# Patient Record
Sex: FEMALE | Employment: OTHER | URBAN - NONMETROPOLITAN AREA
[De-identification: names, ages, dates, MRNs, and addresses within clinical notes are randomized per-mention and may not be internally consistent; named-entity substitution may affect disease eponyms.]

---

## 2020-05-11 ENCOUNTER — OFFICE VISIT (OUTPATIENT)
Dept: FAMILY MEDICINE CLINIC | Age: 85
End: 2020-05-11
Payer: MEDICARE

## 2020-05-11 VITALS
DIASTOLIC BLOOD PRESSURE: 76 MMHG | RESPIRATION RATE: 18 BRPM | TEMPERATURE: 97.7 F | BODY MASS INDEX: 21.79 KG/M2 | HEIGHT: 63 IN | SYSTOLIC BLOOD PRESSURE: 130 MMHG | OXYGEN SATURATION: 97 % | WEIGHT: 123 LBS | HEART RATE: 80 BPM

## 2020-05-11 PROCEDURE — 99204 OFFICE O/P NEW MOD 45 MIN: CPT | Performed by: PHYSICIAN ASSISTANT

## 2020-05-11 RX ORDER — ISOSORBIDE MONONITRATE 30 MG/1
TABLET, EXTENDED RELEASE ORAL
COMMUNITY
Start: 2017-08-22

## 2020-05-11 RX ORDER — FUROSEMIDE 40 MG/1
TABLET ORAL
COMMUNITY
Start: 2017-08-22

## 2020-05-11 RX ORDER — SULFASALAZINE 500 MG/1
TABLET, DELAYED RELEASE ORAL
COMMUNITY

## 2020-05-11 RX ORDER — POTASSIUM CHLORIDE 750 MG/1
TABLET, EXTENDED RELEASE ORAL
COMMUNITY

## 2020-05-11 RX ORDER — CHLORAL HYDRATE 500 MG
CAPSULE ORAL
COMMUNITY
Start: 2017-08-22

## 2020-05-11 RX ORDER — UREA 10 %
LOTION (ML) TOPICAL
COMMUNITY

## 2020-05-11 RX ORDER — POTASSIUM GLUCONATE 595(99)MG
TABLET ORAL
COMMUNITY

## 2020-05-11 SDOH — HEALTH STABILITY: MENTAL HEALTH: HOW OFTEN DO YOU HAVE A DRINK CONTAINING ALCOHOL?: NEVER

## 2020-05-11 NOTE — PROGRESS NOTES
20  Belle Harrington : 1931 Sex: female  Age 80 y.o. Subjective:  Chief Complaint   Patient presents with   Alpha Bathe Fall     pt states she fell on saturday at her home in her garage. Right knee pain with swelling previous total knee replacement, left should pain          HPI:   Belle Harrington , 80 y.o. female presents to [facility] care for evaluation of left shoulder pain and right knee pain status post fall. The patient states that on Saturday she was carrying some groceries in and missed a step and landed into the garage door. She then slid to the ground and landed on her right knee. There is significant swelling and bruising noted to the right knee. The patient has had previous total knee replacement about 10 to 15 years ago. Patient unsure of the name of the surgeon who performed it. The patient is also having left shoulder pain. The patient does have skin tears noted to the left elbow but is not really having any pain or discomfort. She did not hit her head. There is no loss of consciousness. She is able to ambulate. She typically ambulates with a cane. The patient is on anticoagulation. She is not having any visual disturbances. The patient was not lightheaded or dizzy if this was a mechanical fall. ROS:   Unless otherwise stated in this report the patient's positive and negative responses for review of systems for constitutional, eyes, ENT, cardiovascular, respiratory, gastrointestinal, neurological, , musculoskeletal, and integument systems and related systems to the presenting problem are either stated in the history of present illness or were not pertinent or were negative for the symptoms and/or complaints related to the presenting medical problem. Positives and pertinent negatives as per HPI. All others reviewed and are negative.       PMH:     Past Medical History:   Diagnosis Date    CHF (congestive heart failure) ([facility] [address]     Colon cancer (HCC)     Pulmonary embolism [facility])        Past Surgical History:   Procedure Laterality Date    TOTAL KNEE ARTHROPLASTY Right        No family history on file. Medications:     Current Outpatient Medications:     isosorbide mononitrate (IMDUR) 30 MG extended release tablet, Daily, Disp: , Rfl:     furosemide (LASIX) 40 MG tablet, Twice A Day, Disp: , Rfl:     ferrous sulfate (SLOW FE) 140 (45 Fe) MG extended release tablet, , Disp: , Rfl:     Multiple Vitamins-Minerals (MULTIVITAMIN ADULT EXTRA C PO), Take by mouth, Disp: , Rfl:     CoenzymeQ10-Isoleucine-Glycine (CO Q-10) 100-50-25 MG TB24, , Disp: , Rfl:     sulfaSALAzine (AZULFIDINE) 500 MG EC tablet, Take by mouth, Disp: , Rfl:     Glucosamine-Chondroitin 250-200 MG TABS, Take by mouth, Disp: , Rfl:     potassium chloride (KLOR-CON M10) 10 MEQ extended release tablet, Take by mouth, Disp: , Rfl:     Omega-3 1000 MG CAPS, Daily, Disp: , Rfl:     lovastatin (ALTOPREV) 40 MG extended release tablet, Bedtime, Disp: , Rfl:     Cholecalciferol (VITAMIN D-400) 400 UNIT TABS tablet, Take by mouth, Disp: , Rfl:     Apixaban (ELIQUIS PO), Take by mouth, Disp: , Rfl:     Allergies: Allergies   Allergen Reactions    Azithromycin     Cardizem  [Diltiazem Hcl]     Clonidine      Other reaction(s): dry mouth    Digoxin     Diltiazem     Doxycycline     Hydralazine      Other reaction(s): heart palpatations    Levofloxacin     Lisinopril     Penicillins     Valsartan      Other reaction(s): Rash       Social History:     Social History     Tobacco Use    Smoking status: Never Smoker    Smokeless tobacco: Never Used   Substance Use Topics    Alcohol use: Never     Frequency: Never    Drug use: Never       Patient lives at home.     Physical Exam:     Vitals:    05/11/20 1321   BP: 130/76   Pulse: 80   Resp: 18   Temp: 97.7 °F (36.5 °C)   SpO2: 97%   Weight: 123 lb (55.8 kg)   Height: 5' 3\" (1.6 m)       Exam:  Physical Exam  Nurses note and vital signs reviewed and